# Patient Record
Sex: FEMALE | Race: WHITE
[De-identification: names, ages, dates, MRNs, and addresses within clinical notes are randomized per-mention and may not be internally consistent; named-entity substitution may affect disease eponyms.]

---

## 2017-02-09 ENCOUNTER — HOSPITAL ENCOUNTER (OUTPATIENT)
Dept: HOSPITAL 47 - RADMRIMAIN | Age: 22
Discharge: HOME | End: 2017-02-09
Payer: COMMERCIAL

## 2017-02-09 DIAGNOSIS — M51.24: Primary | ICD-10-CM

## 2017-02-09 DIAGNOSIS — M54.5: ICD-10-CM

## 2017-02-09 PROCEDURE — 72146 MRI CHEST SPINE W/O DYE: CPT

## 2017-02-09 PROCEDURE — 72148 MRI LUMBAR SPINE W/O DYE: CPT

## 2017-02-09 NOTE — MR
EXAMINATION TYPE: MR lumbar spine wo con

 

DATE OF EXAM: 2/9/2017 1:08 PM

 

COMPARISON: NONE

 

HISTORY: Back pain

 

TECHNIQUE: T1 and T2  axial and sagittal images of the lumbar spine are submitted.  

 

FINDINGS: There is no abnormal signal seen within the visualized spinal cord or paraspinal soft tissu
es.

 

At L1-2 there is no disc herniation or canal stenosis. No foraminal encroachment. 

 

At L2-3 there is no disc herniation or canal stenosis. No foraminal encroachment.

 

At L3-4 there is  no disc herniation or canal stenosis. No foraminal encroachment

 

At L4-5 there is  no disc herniation or canal stenosis. No foraminal encroachment

 

At L5-S1 there is  no disc herniation or canal stenosis. No foraminal encroachment

 

 

IMPRESSION:

1. Normal MRI lumbar spine.

 

 

 

 

 

EXAMINATION TYPE: MR thoracic spine wo con

 

DATE OF EXAM: 2/9/2017 1:08 PM

 

COMPARISON: NONE

 

HISTORY: Back pain

 

Standard multiplanar, multisequence MRI departmental protocol

 

Multiplanar, multisequence images of the thoracic spine were acquired. Diffusion weighted imaging was
 performed.  

 

FINDINGS: 

At T5-T6 there is left paracentral disc bulging. No spinal cord contact.

 

At T6-T7 there is central and right paracentral disc bulging or small protrusion. Neural foramina rem
ain patent there is mild effacement of the thecal sac.

 

At T8-T9 paracentral disc bulging but no canal stenosis. Mild effacement of thecal sac. Neural forami
na.

 

Remaining levels demonstrate no evidence of disc bulging, herniation, canal stenosis or foraminal enc
roachment.

 

No abnormal signal seen in the visualized spinal cord.

 

IMPRESSION: 

Multilevel disc bulging with mild effacement of thecal sac but no evidence of neural foraminal encroa
chment.

## 2017-02-20 ENCOUNTER — HOSPITAL ENCOUNTER (OUTPATIENT)
Dept: HOSPITAL 47 - RADMRIMAIN | Age: 22
Discharge: HOME | End: 2017-02-20
Payer: COMMERCIAL

## 2017-02-20 DIAGNOSIS — M54.2: ICD-10-CM

## 2017-02-20 DIAGNOSIS — H53.8: Primary | ICD-10-CM

## 2017-02-20 PROCEDURE — 70553 MRI BRAIN STEM W/O & W/DYE: CPT

## 2017-02-20 PROCEDURE — 72141 MRI NECK SPINE W/O DYE: CPT

## 2017-02-20 NOTE — MR
EXAMINATION TYPE: MR cervical spine wo con

 

DATE OF EXAM: 2/20/2017 9:50 PM

 

COMPARISON: NONE

 

HISTORY: CERVICAGIA, VISUAL CHANGES

 

TECHNIQUE: 

Multiplanar, multisequence images of the cervical spine were acquired.

 

C2-C3: No evidence for degenerative disc disease.  No disc bulge/herniation or protrusion.  No Canal 
stenosis.  Foramina are patent bilaterally.

 

C3-C4: No evidence for degenerative disc disease.  No disc bulge/herniation or protrusion.  No Canal 
stenosis.  Foramina are patent bilaterally.

 

C4-C5: No evidence for degenerative disc disease.  No disc bulge/herniation or protrusion.  No Canal 
stenosis.  Foramina are patent bilaterally.

 

C5-C6: No evidence for degenerative disc disease.  No disc bulge/herniation or protrusion.  No Canal 
stenosis.  Foramina are patent bilaterally.

 

C6-C7: No evidence for degenerative disc disease.  No disc bulge/herniation or protrusion.  No Canal 
stenosis.  Foramina are patent bilaterally.

 

C7-T1: No evidence for degenerative disc disease.  No disc bulge/herniation or protrusion.  No Canal 
stenosis.  Foramina are patent bilaterally.

 

Cervical segments are intact.  There is normal alignment.  Cervical spinal cord is of normal signal. 
 Craniovertebral junction relationships are within normal limits.  

 

 

IMPRESSION:

1. No evidence of disc herniation, canal stenosis, or foraminal encroachment.

## 2017-02-21 NOTE — MR
EXAMINATION TYPE: MR brain wo/w con

 

DATE OF EXAM: 2/20/2017 10:16 PM

 

COMPARISON: NONE

 

HISTORY: CERVICALGIA, VISUAL CHANGES

 

CONTRAST: 

Standard multiplanar, multisequence MRI departmental protocol utilizing 13 mL intravenous MultiHance 
gadolinium contrast.  

 

FINDINGS: The ventricles and sulci appear normal. There is no mass effect nor midline shift. There is
 no evidence of intracranial hemorrhage. Gray and white matter structures have fairly normal signal p
attern. There is no evidence of cerebral edema. There is altered signal across the brain on the FLAIR
 images on the axial image 18 that is thought to be artifactual. The T2 images in this area appear no
rmal. The brainstem appears normal. Corpus callosum appears normal. Sella turcica appears normal. The
re is normal uniform enhancement of the sella turcica. I see no pathologic enhancement. The remainder
 of the exam is unremarkable.

 

IMPRESSION: 

Normal MR scan of the brain with and without contrast.

## 2022-11-07 ENCOUNTER — HOSPITAL ENCOUNTER (EMERGENCY)
Dept: HOSPITAL 47 - EC | Age: 27
Discharge: HOME | End: 2022-11-07
Payer: COMMERCIAL

## 2022-11-07 VITALS — SYSTOLIC BLOOD PRESSURE: 115 MMHG | DIASTOLIC BLOOD PRESSURE: 71 MMHG | RESPIRATION RATE: 16 BRPM | HEART RATE: 78 BPM

## 2022-11-07 VITALS — TEMPERATURE: 98.2 F
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
## 2022-11-07 DIAGNOSIS — F17.200: ICD-10-CM

## 2022-11-07 DIAGNOSIS — S41.112A: Primary | ICD-10-CM

## 2022-11-07 PROCEDURE — 99283 EMERGENCY DEPT VISIT LOW MDM: CPT

## 2022-11-07 PROCEDURE — 73060 X-RAY EXAM OF HUMERUS: CPT

## 2022-11-07 PROCEDURE — 12001 RPR S/N/AX/GEN/TRNK 2.5CM/<: CPT

## 2022-11-07 NOTE — XR
EXAMINATION TYPE: XR humerus LT

 

DATE OF EXAM: 11/7/2022

 

CLINICAL HISTORY: Laceration injury with pain.

 

TECHNIQUE:  Two views of the left humerus are obtained.

 

COMPARISON: None.

 

FINDINGS:  There is no acute fracture or dislocation seen in the left humerus.  The left shoulder and
 elbow joints appear within normal limits.  The overlying soft tissue appears within normal limits.

 

IMPRESSION: Unremarkable study.

## 2022-11-07 NOTE — ED
Wound/Laceration HPI





- General


Chief Complaint: Wound/Laceration


Stated Complaint: Left arm injury


Time Seen by Provider: 11/07/22 15:12


Source: patient


Mode of arrival: ambulatory


Limitations: no limitations





- History of Present Illness


Initial Comments: 


Patient is a 27-year-old female presenting with chief complaint of laceration to

the left arm.  Patient was moving a fish tank, when she dropped it, this 

resulted in a 1 cm cut to the left before meals.  Upon arrival bleeding is well-

controlled.  Patient denies any numbness, tingling, weakness.  Her last tetanus 

shot was 6 years ago.








- Related Data


                                    Allergies











Allergy/AdvReac Type Severity Reaction Status Date / Time


 


No Known Allergies Allergy   Verified 11/07/22 14:43














Review of Systems


ROS Statement: 


Those systems with pertinent positive or pertinent negative responses have been 

documented in the HPI.





ROS Other: All systems not noted in ROS Statement are negative.





Past Medical History


Past Medical History: No Reported History


History of Any Multi-Drug Resistant Organisms: None Reported


Past Surgical History: No Surgical Hx Reported


Past Psychological History: No Psychological Hx Reported


Smoking Status: Current every day smoker, Never smoker


Past Alcohol Use History: None Reported


Past Drug Use History: None Reported





General Exam


Limitations: no limitations


General appearance: alert, in no apparent distress


Head exam: Present: atraumatic, normocephalic, normal inspection


Eye exam: Present: normal appearance


Neck exam: Present: normal inspection


Respiratory exam: Present: normal lung sounds bilaterally.  Absent: respiratory 

distress, wheezes, rales, rhonchi, stridor


Cardiovascular Exam: Present: regular rate, normal rhythm, normal heart sounds. 

Absent: systolic murmur, diastolic murmur, rubs, gallop, clicks


Extremities exam: Present: full ROM


Neurological exam: Present: alert, oriented X3, CN II-XII intact


Psychiatric exam: Present: normal affect, normal mood


  ** Expanded


Type of lesion: Present: laceration (1 cm, located on the left AC)





Course


                                   Vital Signs











  11/07/22





  14:39


 


Temperature 98.2 F


 


Pulse Rate 78


 


Respiratory 16





Rate 


 


Blood Pressure 115/71


 


O2 Sat by Pulse 100





Oximetry 














Procedures





- Laceration


  ** Laceration #1


Consent Obtained: verbal consent


Indication: laceration


Site: upper extremity (Left AC)


Size (cm): 1


Description: linear


Depth: simple, single layer


Anesthetic Used: lidocaine 1%


Anesthesia Technique: local infiltration


Amount (mls): 2


Pre-repair: wound explored, irrigated extensively


Type of Sutures: nylon


Size of Sutures: 4-0


Number of Sutures: 3


Technique: simple, interrupted


Patient Tolerated Procedure: well





Medical Decision Making





- Medical Decision Making


Patient is a 27-year-old female with chief complaint of laceration.  Laceration 

located on the left before meals, patient has full range of motion and 

sensation.  Wound is repaired, see procedure note for details.  Patient is 

up-to-date on tetanus.  Educated on wound care. Follow-up with PCP.  Report back

to ER with any new or worsening symptoms.  Discussed return parameters and 

answered all questions.  Patient conveyed verbal understanding and agreed to the

plan.  I discussed this case in detail with my attending Dr. Khoury








Disposition


Clinical Impression: 


 Laceration





Disposition: HOME SELF-CARE


Condition: Good


Instructions (If sedation given, give patient instructions):  Care For Your 

Stitches (ED), Laceration (ED)


Additional Instructions: 


Follow-up with PCP.  Report back to ER with any new or worsening symptoms.  Keep

the wound clean, dry, and covered.  Wash gently with soap and water.  Avoid 

prolonged submersion, such as swimming or baths.  Sutures may be removed in 7-10

days.  Signs of infection include redness, swelling, warmth, tenderness, 

discharge, fever, chills.


Is patient prescribed a controlled substance at d/c from ED?: No


Referrals: 


None,Stated [Primary Care Provider] - 1-2 days


Time of Disposition: 16:50

## 2023-03-23 ENCOUNTER — HOSPITAL ENCOUNTER (OUTPATIENT)
Dept: HOSPITAL 47 - RADXRMAIN | Age: 28
Discharge: HOME | End: 2023-03-23
Attending: STUDENT IN AN ORGANIZED HEALTH CARE EDUCATION/TRAINING PROGRAM
Payer: COMMERCIAL

## 2023-03-23 DIAGNOSIS — M89.8X7: ICD-10-CM

## 2023-03-23 DIAGNOSIS — S90.852A: Primary | ICD-10-CM

## 2023-03-23 NOTE — XR
EXAMINATION TYPE: XR foot complete LT

 

DATE OF EXAM: 3/23/2023

 

CLINICAL HISTORY: Checking for foreign objects. Incident occurred 6 months ago

 

TECHNIQUE: Frontal, lateral, and oblique images of the left foot are obtained.

 

COMPARISON: None

 

FINDINGS:  There is no acute fracture/dislocation evident in the left foot.  The joint spaces in the 
left foot appear within normal limits.  The overlying soft tissue appears unremarkable. There is an o
ssific density at the dorsal aspect of the first tarsometatarsal joint.

 

IMPRESSION:  There is no acute fracture or dislocation in the left foot. Ossific density at the dorsa
l aspect of the first tarsometatarsal joint is favored to be an accessory ossicle. No foreign body is
 seen.